# Patient Record
Sex: MALE | Race: WHITE | URBAN - METROPOLITAN AREA
[De-identification: names, ages, dates, MRNs, and addresses within clinical notes are randomized per-mention and may not be internally consistent; named-entity substitution may affect disease eponyms.]

---

## 2022-03-08 ENCOUNTER — OFFICE VISIT (OUTPATIENT)
Dept: ORTHOPEDIC SURGERY | Age: 27
End: 2022-03-08
Payer: COMMERCIAL

## 2022-03-08 VITALS — WEIGHT: 175 LBS | BODY MASS INDEX: 28.12 KG/M2 | HEIGHT: 66 IN

## 2022-03-08 DIAGNOSIS — M25.512 ACUTE PAIN OF LEFT SHOULDER: Primary | ICD-10-CM

## 2022-03-08 DIAGNOSIS — M75.22 TENDINITIS OF LONG HEAD OF BICEPS BRACHII OF LEFT SHOULDER: ICD-10-CM

## 2022-03-08 PROCEDURE — 99204 OFFICE O/P NEW MOD 45 MIN: CPT | Performed by: STUDENT IN AN ORGANIZED HEALTH CARE EDUCATION/TRAINING PROGRAM

## 2022-03-08 RX ORDER — METHYLPREDNISOLONE 4 MG/1
TABLET ORAL
Qty: 1 KIT | Refills: 0 | Status: SHIPPED | OUTPATIENT
Start: 2022-03-08

## 2022-03-08 NOTE — PROGRESS NOTES
Joint Tenderness:   biceps tendon   Neer:   negative   Zavaleta:   negative   Strength:   5/5 supraspinatus, ER and IR    Right shoulder: equivocal    Drop-arm test:   negative   Belly-press test:   negative   Bear-hug test:   negative   Speed's test:   positive   Bicipital groove tenderness:  positive   Clark's test:   negative   Cross-body adduction test:   negative    AC joint tenderness:   negative   Scapular dyskinesis:   absent  Right shoulder: absent   Atrophy:   none noted     Right shoulder: none noted      There are no skin lesions, cellulitis, or extreme edema in the upper extremities. Sensation is grossly intact to light touch bilaterally upper extremity. The patient has warm and well-perfused Bilateral upper extremities with brisk capillary refill. Imaging   Left Shoulder X-Ray: 3 views obtained and reviewed  AC Joint: no abnormalities noted  Glenohumeral joint: no abnormalities noted  Elevation humeral head: absent    Assessment:      Left shoulder long head biceps tendonitis       Plan:      Natural history and expected course discussed. Questions answered. Provided with home exercise program for biceps tendonitis and rotator cuff stability. He has access to bands and weights with baseball and plans to do this prior to practice. Medrol dosepak sent to pharmacy to help with pain and inflammation. Ice as needed. Avoid direct contact with the skin. Follow up if pain is worsening or if no improvement with HEP. Nakul Goodwin PA-C  Board Certified by the [employer] on Certification of [address] and [address]: This note was generated with use of a verbal recognition program and an attempt was made to check for errors. It is possible that there are still dictated errors within this office note. If so, please bring any significant errors to my attention for an addendum.   All efforts were made to ensure that this office note is accurate.

## 2023-05-23 ENCOUNTER — APPOINTMENT (OUTPATIENT)
Dept: CT IMAGING | Age: 28
End: 2023-05-23
Payer: COMMERCIAL

## 2023-05-23 ENCOUNTER — HOSPITAL ENCOUNTER (EMERGENCY)
Age: 28
Discharge: HOME | End: 2023-05-23
Attending: EMERGENCY MEDICINE
Payer: COMMERCIAL

## 2023-05-23 VITALS
SYSTOLIC BLOOD PRESSURE: 137 MMHG | TEMPERATURE: 98 F | BODY MASS INDEX: 30.73 KG/M2 | DIASTOLIC BLOOD PRESSURE: 66 MMHG | WEIGHT: 191.2 LBS | RESPIRATION RATE: 12 BRPM | HEIGHT: 66 IN | OXYGEN SATURATION: 100 % | HEART RATE: 77 BPM

## 2023-05-23 DIAGNOSIS — S00.11XA CONTUSION OF RIGHT EYELID, INITIAL ENCOUNTER: ICD-10-CM

## 2023-05-23 DIAGNOSIS — S01.81XA FACIAL LACERATION, INITIAL ENCOUNTER: Primary | ICD-10-CM

## 2023-05-23 PROCEDURE — 99284 EMERGENCY DEPT VISIT MOD MDM: CPT

## 2023-05-23 PROCEDURE — 70450 CT HEAD/BRAIN W/O DYE: CPT

## 2023-05-23 PROCEDURE — 12011 RPR F/E/E/N/L/M 2.5 CM/<: CPT

## 2023-05-23 RX ORDER — CETIRIZINE HYDROCHLORIDE 10 MG/1
10 TABLET ORAL DAILY
COMMUNITY

## 2023-05-23 RX ORDER — OMEPRAZOLE 20 MG/1
CAPSULE, DELAYED RELEASE ORAL
COMMUNITY
Start: 2023-04-26

## 2023-05-23 ASSESSMENT — PAIN DESCRIPTION - DESCRIPTORS
DESCRIPTORS: DISCOMFORT
DESCRIPTORS: DISCOMFORT

## 2023-05-23 ASSESSMENT — PAIN - FUNCTIONAL ASSESSMENT
PAIN_FUNCTIONAL_ASSESSMENT: 0-10
PAIN_FUNCTIONAL_ASSESSMENT: ACTIVITIES ARE NOT PREVENTED
PAIN_FUNCTIONAL_ASSESSMENT: ACTIVITIES ARE NOT PREVENTED
PAIN_FUNCTIONAL_ASSESSMENT: 0-10

## 2023-05-23 ASSESSMENT — PAIN DESCRIPTION - PAIN TYPE
TYPE: ACUTE PAIN
TYPE: ACUTE PAIN

## 2023-05-23 ASSESSMENT — PAIN DESCRIPTION - LOCATION
LOCATION: FACE
LOCATION: HEAD

## 2023-05-23 ASSESSMENT — PAIN SCALES - GENERAL
PAINLEVEL_OUTOF10: 2
PAINLEVEL_OUTOF10: 2

## 2023-05-23 ASSESSMENT — PAIN DESCRIPTION - ONSET
ONSET: SUDDEN
ONSET: SUDDEN

## 2023-05-23 ASSESSMENT — PAIN DESCRIPTION - ORIENTATION
ORIENTATION: RIGHT
ORIENTATION: RIGHT

## 2023-05-23 ASSESSMENT — PAIN DESCRIPTION - FREQUENCY
FREQUENCY: INTERMITTENT
FREQUENCY: INTERMITTENT

## 2023-05-24 NOTE — ED TRIAGE NOTES
Pt presents to ED for injury to right forehead from being hit with a softball. Pt denies LOC at time of incident.

## 2023-05-24 NOTE — ED PROVIDER NOTES
Standard drinks or equivalent per week    Drug use: Never    Sexual activity: Yes     Partners: Female       SCREENINGS    Tomasz Coma Scale  Eye Opening: Spontaneous  Best Verbal Response: Oriented  Best Motor Response: Obeys commands  Tomasz Coma Scale Score: 15 @FLOW(82628216)@      PHYSICAL EXAM    (up to 7 for level 4, 8 or more for level 5)     ED Triage Vitals [05/23/23 2143]   BP Temp Temp Source Pulse Respirations SpO2 Height Weight - Scale   (!) 143/101 98 °F (36.7 °C) Oral 77 12 100 % 5' 6\" (1.676 m) 191 lb 3.2 oz (86.7 kg)       Physical Exam      General Appearance:  Alert, cooperative, no distress, appears stated age. Head:  Normocephalic, without obviousabnormality, atraumatic. Superficial laceration measures 0.25 comjust medial to the eye brow on the right side there is significant swelling which includes the supraorbital region as well as part of the frontal bone extraocular motions are intact   Eyes:  conjunctiva/corneas clear, EOM's intact. Sclera anicteric. ENT: Mucous membranes moist.   Neck: Supple, symmetrical, trachea midline, no adenopathy. No jugular venous distention. Extremities: No edema, cords or calf tenderness. Full range of motion. Pulses: 2+ and symmetric   Skin: Turgor is normal, no rashes or lesions. Neurologic: Alert and oriented X 3. No focal findings. Motor grossly normal.  Speech clear, no drift, CN III-XII grossly intact,        DIAGNOSTIC RESULTS   LABS:    Labs Reviewed - No data to display    All other labs were within normal range or not returned as of this dictation. EKG: All EKG's are interpreted by the Emergency Department Physician who eithersigns or Co-signs this chart in the absence of a cardiologist.        RADIOLOGY:   Non-plain film images such as CT, Ultrasound and MRI are read by the radiologist. Plain radiographic images are visualized by myself.       *    Interpretation per the Radiologist below, if available at the time

## 2023-05-24 NOTE — ED NOTES
Patient prepared for and ready to be discharged. Patient discharged at this time in no acute distress after verbalizing understanding of discharge instructions. Patient left after receiving After Visit Summary instructions.         Dyole Barkley RN  05/23/23 6907